# Patient Record
Sex: MALE | Race: WHITE | NOT HISPANIC OR LATINO | Employment: FULL TIME | URBAN - METROPOLITAN AREA
[De-identification: names, ages, dates, MRNs, and addresses within clinical notes are randomized per-mention and may not be internally consistent; named-entity substitution may affect disease eponyms.]

---

## 2023-08-24 ENCOUNTER — OFFICE VISIT (OUTPATIENT)
Dept: PRIMARY CARE | Facility: CLINIC | Age: 48
End: 2023-08-24

## 2023-08-24 VITALS — WEIGHT: 198 LBS | BODY MASS INDEX: 31.82 KG/M2 | TEMPERATURE: 97.3 F | HEIGHT: 66 IN

## 2023-08-24 DIAGNOSIS — L65.9 HAIR LOSS: Primary | ICD-10-CM

## 2023-08-24 PROCEDURE — 99214 OFFICE O/P EST MOD 30 MIN: CPT | Performed by: INTERNAL MEDICINE

## 2023-08-24 PROCEDURE — PRPHR PRP HAIR REJUVENATION: Performed by: INTERNAL MEDICINE

## 2023-08-24 NOTE — PROGRESS NOTES
Patient is presented for PRP treatment.    This is a 48 years old gentleman with history of hair loss, status post hair transplant 2020.  Presented for PRP treatment.  Patient has had hair transplant in [place] 2020, tolerated the surgery well.  Pleased with results.  Stated, that was recommended to have PRP sessions done, after the transplant.    Discussed with patient about procedure.  Patient is aware of side effect, including, but not limited to hematoma, bleeding, infection, pain, discomfort.  Consent form has been signed.    Patient pictures has been obtained.    5 cc of PRP has been placed to the frontal area.  No complications.  Patient tolerated procedure well.

## 2024-02-21 ENCOUNTER — APPOINTMENT (OUTPATIENT)
Dept: PRIMARY CARE | Facility: CLINIC | Age: 49
End: 2024-02-21

## 2024-02-21 ENCOUNTER — OFFICE VISIT (OUTPATIENT)
Dept: PRIMARY CARE | Facility: CLINIC | Age: 49
End: 2024-02-21

## 2024-02-21 VITALS — TEMPERATURE: 97.3 F

## 2024-02-21 DIAGNOSIS — L65.9 HAIR LOSS: Primary | ICD-10-CM

## 2024-02-21 PROCEDURE — 99024 POSTOP FOLLOW-UP VISIT: CPT | Performed by: INTERNAL MEDICINE

## 2024-02-21 PROCEDURE — PRPHR PRP HAIR REJUVENATION: Performed by: INTERNAL MEDICINE

## 2024-02-22 NOTE — PROGRESS NOTES
Patient is presented for PRP treatment.     This is a 48 years old gentleman with history of hair loss, status post hair transplant 2020.    Presented for PRP treatment # 2.  Patient has had hair transplant in [place] 2020, tolerated the surgery well.  Pleased with results.  Stated, that was recommended to have PRP sessions done, after the transplant.     Discussed with patient about procedure.  Patient is aware of side effect, including, but not limited to hematoma, bleeding, infection, pain, discomfort.  Consent form has been signed.     Patient pictures has been obtained.     5 cc of PRP has been placed to the frontal area.  No complications.  Patient tolerated procedure well.